# Patient Record
Sex: FEMALE | Race: WHITE
[De-identification: names, ages, dates, MRNs, and addresses within clinical notes are randomized per-mention and may not be internally consistent; named-entity substitution may affect disease eponyms.]

---

## 2017-11-14 ENCOUNTER — HOSPITAL ENCOUNTER (EMERGENCY)
Dept: HOSPITAL 12 - ER | Age: 47
Discharge: LEFT BEFORE DISCHARGE | End: 2017-11-14
Payer: MEDICAID

## 2017-11-14 VITALS — DIASTOLIC BLOOD PRESSURE: 86 MMHG | SYSTOLIC BLOOD PRESSURE: 132 MMHG

## 2017-11-14 VITALS — BODY MASS INDEX: 21.56 KG/M2 | HEIGHT: 64 IN | WEIGHT: 126.25 LBS

## 2017-11-14 DIAGNOSIS — R10.9: Primary | ICD-10-CM

## 2017-11-14 LAB
ALP SERPL-CCNC: 73 U/L (ref 50–136)
ALT SERPL W/O P-5'-P-CCNC: 22 U/L (ref 14–59)
APPEARANCE UR: CLEAR
AST SERPL-CCNC: 11 U/L (ref 15–37)
BASOPHILS # BLD AUTO: 0 K/UL (ref 0–8)
BASOPHILS NFR BLD AUTO: 0.1 % (ref 0–2)
BILIRUB DIRECT SERPL-MCNC: 0.1 MG/DL (ref 0–0.2)
BILIRUB SERPL-MCNC: 0.3 MG/DL (ref 0.2–1)
BILIRUB UR QL STRIP: NEGATIVE
BUN SERPL-MCNC: 9 MG/DL (ref 7–18)
CHLORIDE SERPL-SCNC: 102 MMOL/L (ref 98–107)
CO2 SERPL-SCNC: 25 MMOL/L (ref 21–32)
COLOR UR: YELLOW
CREAT SERPL-MCNC: 0.5 MG/DL (ref 0.6–1.3)
DEPRECATED SQUAMOUS URNS QL MICRO: (no result) /HPF
EOSINOPHIL # BLD AUTO: 0 K/UL (ref 0–0.7)
EOSINOPHIL NFR BLD AUTO: 0.4 % (ref 0–7)
GLUCOSE SERPL-MCNC: 87 MG/DL (ref 74–106)
GLUCOSE UR STRIP-MCNC: NEGATIVE MG/DL
HCG UR QL: NEGATIVE
HCT VFR BLD AUTO: 36 % (ref 37–47)
HGB BLD-MCNC: 11.5 G/DL (ref 12–16)
HGB UR QL STRIP: NEGATIVE
KETONES UR STRIP-MCNC: (no result) MG/DL
LEUKOCYTE ESTERASE UR QL STRIP: NEGATIVE
LIPASE SERPL-CCNC: 122 U/L (ref 73–393)
LYMPHOCYTES # BLD AUTO: 1.2 K/UL (ref 0.8–4.8)
LYMPHOCYTES NFR BLD AUTO: 10.6 % (ref 20.5–51.5)
LYMPHOCYTES NFR BLD MANUAL: 7 % (ref 20–40)
MCH RBC QN AUTO: 24.9 UUG (ref 27–31)
MCHC RBC AUTO-ENTMCNC: 32 G/DL (ref 32–37)
MCV RBC AUTO: 78.4 FL (ref 81–99)
MONOCYTES # BLD AUTO: 0.4 K/UL (ref 0.1–1.3)
MONOCYTES NFR BLD AUTO: 3.6 % (ref 0–11)
MONOCYTES NFR BLD MANUAL: 4 % (ref 2–10)
MUCOUS THREADS URNS QL MICRO: (no result) /LPF
NEUTROPHILS # BLD AUTO: 9.8 K/UL (ref 1.8–8.9)
NEUTROPHILS NFR BLD AUTO: 85.3 % (ref 38.5–71.5)
NEUTS BAND NFR BLD MANUAL: 12 % (ref 0–10)
NEUTS SEG NFR BLD MANUAL: 77 % (ref 42–75)
NITRITE UR QL STRIP: NEGATIVE
PH UR STRIP: 6 [PH] (ref 5–8)
PLATELET # BLD AUTO: 486 K/UL (ref 150–450)
POTASSIUM SERPL-SCNC: 3.7 MMOL/L (ref 3.5–5.1)
PROT UR QL STRIP: (no result)
RBC # BLD AUTO: 4.59 MIL/UL (ref 4.2–5.4)
RBC #/AREA URNS HPF: (no result) /HPF (ref 0–3)
SP GR UR STRIP: 1.03 (ref 1–1.03)
UROBILINOGEN UR STRIP-MCNC: 0.2 E.U./DL
WBC # BLD AUTO: 11.4 K/UL (ref 4–11.2)
WBC #/AREA URNS HPF: (no result) /HPF
WBC #/AREA URNS HPF: (no result) /HPF (ref 0–3)
WS STN SPEC: 7 G/DL (ref 6.4–8.2)

## 2017-11-14 PROCEDURE — 83690 ASSAY OF LIPASE: CPT

## 2017-11-14 PROCEDURE — 84703 CHORIONIC GONADOTROPIN ASSAY: CPT

## 2017-11-14 PROCEDURE — 74177 CT ABD & PELVIS W/CONTRAST: CPT

## 2017-11-14 PROCEDURE — 74020: CPT

## 2017-11-14 PROCEDURE — A4663 DIALYSIS BLOOD PRESSURE CUFF: HCPCS

## 2017-11-14 PROCEDURE — 99285 EMERGENCY DEPT VISIT HI MDM: CPT

## 2017-11-14 PROCEDURE — 80048 BASIC METABOLIC PNL TOTAL CA: CPT

## 2017-11-14 PROCEDURE — 96365 THER/PROPH/DIAG IV INF INIT: CPT

## 2017-11-14 PROCEDURE — 85025 COMPLETE CBC W/AUTO DIFF WBC: CPT

## 2017-11-14 PROCEDURE — 96375 TX/PRO/DX INJ NEW DRUG ADDON: CPT

## 2017-11-14 PROCEDURE — 96361 HYDRATE IV INFUSION ADD-ON: CPT

## 2017-11-14 PROCEDURE — 96376 TX/PRO/DX INJ SAME DRUG ADON: CPT

## 2017-11-14 PROCEDURE — 36415 COLL VENOUS BLD VENIPUNCTURE: CPT

## 2017-11-14 PROCEDURE — 81001 URINALYSIS AUTO W/SCOPE: CPT

## 2017-11-14 PROCEDURE — 80076 HEPATIC FUNCTION PANEL: CPT

## 2017-11-14 NOTE — NUR
Patient does not wish to proceed with medical care recommended by Dr. Cecilia LÓPEZ).  Patient given information related to possible complications, up to and 
including death, which could occur as a result of leaving the hospital at this 
time.  Patient verbalizes understanding of risks involved due to leaving 
against medical advice.  Patient has signed AMA form.

-------------------------------------------------------------------------------

Addendum: 11/14/17 at 1652 by PETEY

-------------------------------------------------------------------------------

COOPY OF ALL THE STUDIES PROVIDED FOR PT.

## 2018-01-04 ENCOUNTER — HOSPITAL ENCOUNTER (EMERGENCY)
Dept: HOSPITAL 12 - ER | Age: 48
Discharge: HOME | End: 2018-01-04
Payer: COMMERCIAL

## 2018-01-04 VITALS — SYSTOLIC BLOOD PRESSURE: 111 MMHG | DIASTOLIC BLOOD PRESSURE: 78 MMHG

## 2018-01-04 VITALS — HEIGHT: 60 IN | BODY MASS INDEX: 23.56 KG/M2 | WEIGHT: 120 LBS

## 2018-01-04 DIAGNOSIS — D64.9: ICD-10-CM

## 2018-01-04 DIAGNOSIS — Z85.038: ICD-10-CM

## 2018-01-04 DIAGNOSIS — K52.9: Primary | ICD-10-CM

## 2018-01-04 DIAGNOSIS — R10.31: ICD-10-CM

## 2018-01-04 DIAGNOSIS — Z98.890: ICD-10-CM

## 2018-01-04 DIAGNOSIS — Z90.49: ICD-10-CM

## 2018-01-04 LAB
ALP SERPL-CCNC: 85 U/L (ref 50–136)
ALT SERPL W/O P-5'-P-CCNC: 25 U/L (ref 14–59)
AST SERPL-CCNC: 12 U/L (ref 15–37)
BASOPHILS # BLD AUTO: 0.1 K/UL (ref 0–8)
BASOPHILS NFR BLD AUTO: 0.8 % (ref 0–2)
BILIRUB DIRECT SERPL-MCNC: 0.1 MG/DL (ref 0–0.2)
BILIRUB SERPL-MCNC: 0.1 MG/DL (ref 0.2–1)
BUN SERPL-MCNC: 10 MG/DL (ref 7–18)
CHLORIDE SERPL-SCNC: 103 MMOL/L (ref 98–107)
CO2 SERPL-SCNC: 27 MMOL/L (ref 21–32)
CREAT SERPL-MCNC: 0.6 MG/DL (ref 0.6–1.3)
EOSINOPHIL # BLD AUTO: 0 K/UL (ref 0–0.7)
EOSINOPHIL NFR BLD AUTO: 0.7 % (ref 0–7)
GLUCOSE SERPL-MCNC: 91 MG/DL (ref 74–106)
HCT VFR BLD AUTO: 30.4 % (ref 31.2–41.9)
HGB BLD-MCNC: 9.8 G/DL (ref 10.9–14.3)
LIPASE SERPL-CCNC: 190 U/L (ref 73–393)
LYMPHOCYTES # BLD AUTO: 2.7 K/UL (ref 20–40)
LYMPHOCYTES NFR BLD AUTO: 41.2 % (ref 20.5–51.5)
MCH RBC QN AUTO: 24.4 UUG (ref 24.7–32.8)
MCHC RBC AUTO-ENTMCNC: 32 G/DL (ref 32.3–35.6)
MCV RBC AUTO: 76 FL (ref 75.5–95.3)
MONOCYTES # BLD AUTO: 0.4 K/UL (ref 2–10)
MONOCYTES NFR BLD AUTO: 6.3 % (ref 0–11)
NEUTROPHILS # BLD AUTO: 3.3 K/UL (ref 1.8–8.9)
NEUTROPHILS NFR BLD AUTO: 51 % (ref 38.5–71.5)
PLATELET # BLD AUTO: 350 K/UL (ref 179–408)
POTASSIUM SERPL-SCNC: 3.6 MMOL/L (ref 3.5–5.1)
RBC # BLD AUTO: 4 MIL/UL (ref 3.63–4.92)
WBC # BLD AUTO: 6.5 K/UL (ref 3.8–11.8)
WS STN SPEC: 7.3 G/DL (ref 6.4–8.2)

## 2018-01-04 PROCEDURE — A4663 DIALYSIS BLOOD PRESSURE CUFF: HCPCS

## 2018-01-04 NOTE — NUR
Per MD orders, pt able to be discharged home. Pt able to ambulate unassisted w/ 
steady gait. Written and verbal after care instructions given. Pt instructed to 
return if pain continues and follow up with Primary physician. Pt verbalizes 
understanding of instructions.

## 2018-02-03 ENCOUNTER — HOSPITAL ENCOUNTER (EMERGENCY)
Dept: HOSPITAL 12 - ER | Age: 48
Discharge: HOME | End: 2018-02-03
Payer: MEDICAID

## 2018-02-03 VITALS — HEIGHT: 65 IN | BODY MASS INDEX: 19.99 KG/M2 | WEIGHT: 120 LBS

## 2018-02-03 DIAGNOSIS — J06.9: Primary | ICD-10-CM

## 2018-02-03 DIAGNOSIS — Z85.038: ICD-10-CM

## 2018-02-03 PROCEDURE — A4663 DIALYSIS BLOOD PRESSURE CUFF: HCPCS

## 2018-08-28 ENCOUNTER — HOSPITAL ENCOUNTER (EMERGENCY)
Dept: HOSPITAL 12 - ER | Age: 48
Discharge: HOME | End: 2018-08-28
Payer: COMMERCIAL

## 2018-08-28 VITALS — HEIGHT: 65 IN | WEIGHT: 120 LBS | BODY MASS INDEX: 19.99 KG/M2

## 2018-08-28 VITALS — SYSTOLIC BLOOD PRESSURE: 118 MMHG | DIASTOLIC BLOOD PRESSURE: 81 MMHG

## 2018-08-28 DIAGNOSIS — Z90.49: ICD-10-CM

## 2018-08-28 DIAGNOSIS — N39.0: Primary | ICD-10-CM

## 2018-08-28 LAB
APPEARANCE UR: CLEAR
BILIRUB UR QL STRIP: NEGATIVE
COLOR UR: YELLOW
DEPRECATED SQUAMOUS URNS QL MICRO: (no result) /HPF
GLUCOSE UR STRIP-MCNC: NEGATIVE MG/DL
HCG UR QL: NEGATIVE
HGB UR QL STRIP: (no result)
KETONES UR STRIP-MCNC: NEGATIVE MG/DL
LEUKOCYTE ESTERASE UR QL STRIP: NEGATIVE
MUCOUS THREADS URNS QL MICRO: (no result) /LPF
NITRITE UR QL STRIP: NEGATIVE
PH UR STRIP: 5.5 [PH] (ref 5–8)
PROT UR QL STRIP: NEGATIVE
RBC #/AREA URNS HPF: (no result) /HPF (ref 0–3)
SP GR UR STRIP: 1.02 (ref 1–1.03)
UROBILINOGEN UR STRIP-MCNC: 0.2 E.U./DL
WBC #/AREA URNS HPF: (no result) /HPF
WBC #/AREA URNS HPF: (no result) /HPF (ref 0–3)

## 2018-08-28 PROCEDURE — A4663 DIALYSIS BLOOD PRESSURE CUFF: HCPCS

## 2018-08-28 PROCEDURE — 84703 CHORIONIC GONADOTROPIN ASSAY: CPT

## 2018-08-28 PROCEDURE — 81001 URINALYSIS AUTO W/SCOPE: CPT

## 2018-08-28 PROCEDURE — 99284 EMERGENCY DEPT VISIT MOD MDM: CPT

## 2018-08-28 PROCEDURE — 87086 URINE CULTURE/COLONY COUNT: CPT

## 2018-08-28 NOTE — NUR
ADMIT A FEMALE PT IN RM 2A, AMBULATORY WITH A CHIEF C/O URGENCY IN URINATION 
BUT VERY LITTLE COMIS OUT. DENIES ANY PAIN.

## 2018-08-28 NOTE — NUR
-------------------------------------------------------------------------------

           *** Note teresa in ED - 08/28/18 at 1120 by DIEGO ***            

-------------------------------------------------------------------------------

ADMIT A FEMALE PT IN RM 2A, AMBULATORY FROM HOME WITH A CHIEF C/O DIFFICULTY IN 
URINATION/URGENCY BUT VERY LITTLE URINE OUT. DENIES OF ANY PAIN. URINE SPECIMEN 
SENT TO LAB.

## 2018-08-28 NOTE — NUR
PT IS BEING DISCHARGED. DR GARBER SPOKE WITH THE PT. DISCHARGE INSTRUCTION 
GIVEN TO PT WITH GOOD UNDERSTANDING. PRESCRIPTION ALSO GIVEN.

## 2019-03-09 ENCOUNTER — HOSPITAL ENCOUNTER (INPATIENT)
Dept: HOSPITAL 12 - ER | Age: 49
LOS: 1 days | Discharge: LEFT BEFORE DISCHARGE | DRG: 240 | End: 2019-03-10
Payer: COMMERCIAL

## 2019-03-09 VITALS — WEIGHT: 130 LBS | BODY MASS INDEX: 22.2 KG/M2 | HEIGHT: 64 IN

## 2019-03-09 VITALS — DIASTOLIC BLOOD PRESSURE: 78 MMHG | SYSTOLIC BLOOD PRESSURE: 115 MMHG

## 2019-03-09 VITALS — SYSTOLIC BLOOD PRESSURE: 128 MMHG | DIASTOLIC BLOOD PRESSURE: 83 MMHG

## 2019-03-09 VITALS — SYSTOLIC BLOOD PRESSURE: 114 MMHG | DIASTOLIC BLOOD PRESSURE: 80 MMHG

## 2019-03-09 DIAGNOSIS — N28.1: ICD-10-CM

## 2019-03-09 DIAGNOSIS — R11.10: ICD-10-CM

## 2019-03-09 DIAGNOSIS — C79.89: ICD-10-CM

## 2019-03-09 DIAGNOSIS — N83.9: ICD-10-CM

## 2019-03-09 DIAGNOSIS — R18.8: ICD-10-CM

## 2019-03-09 DIAGNOSIS — E88.09: ICD-10-CM

## 2019-03-09 DIAGNOSIS — Z90.49: ICD-10-CM

## 2019-03-09 DIAGNOSIS — C18.9: Primary | ICD-10-CM

## 2019-03-09 DIAGNOSIS — C78.7: ICD-10-CM

## 2019-03-09 LAB
ALP SERPL-CCNC: 88 U/L (ref 50–136)
ALT SERPL W/O P-5'-P-CCNC: 18 U/L (ref 14–59)
APPEARANCE UR: CLEAR
AST SERPL-CCNC: 20 U/L (ref 15–37)
BASOPHILS # BLD AUTO: 0.1 K/UL (ref 0–8)
BASOPHILS NFR BLD AUTO: 1.2 % (ref 0–2)
BILIRUB DIRECT SERPL-MCNC: 0.1 MG/DL (ref 0–0.2)
BILIRUB SERPL-MCNC: 0.4 MG/DL (ref 0.2–1)
BILIRUB UR QL STRIP: (no result)
BUN SERPL-MCNC: 6 MG/DL (ref 7–18)
CHLORIDE SERPL-SCNC: 100 MMOL/L (ref 98–107)
CO2 SERPL-SCNC: 27 MMOL/L (ref 21–32)
COLOR UR: YELLOW
CREAT SERPL-MCNC: 0.5 MG/DL (ref 0.6–1.3)
DEPRECATED SQUAMOUS URNS QL MICRO: (no result) /HPF
EOSINOPHIL # BLD AUTO: 0.1 K/UL (ref 0–0.7)
EOSINOPHIL NFR BLD AUTO: 1.4 % (ref 0–7)
GLUCOSE SERPL-MCNC: 97 MG/DL (ref 74–106)
GLUCOSE UR STRIP-MCNC: NEGATIVE MG/DL
HCT VFR BLD AUTO: 40.1 % (ref 31.2–41.9)
HGB BLD-MCNC: 13.2 G/DL (ref 10.9–14.3)
HGB UR QL STRIP: (no result)
KETONES UR STRIP-MCNC: (no result) MG/DL
LEUKOCYTE ESTERASE UR QL STRIP: (no result)
LIPASE SERPL-CCNC: 78 U/L (ref 73–393)
LYMPHOCYTES # BLD AUTO: 1.5 K/UL (ref 20–40)
LYMPHOCYTES NFR BLD AUTO: 24.2 % (ref 20.5–51.5)
MCH RBC QN AUTO: 27.2 UUG (ref 24.7–32.8)
MCHC RBC AUTO-ENTMCNC: 33 G/DL (ref 32.3–35.6)
MCV RBC AUTO: 82.6 FL (ref 75.5–95.3)
MONOCYTES # BLD AUTO: 0.4 K/UL (ref 2–10)
MONOCYTES NFR BLD AUTO: 6 % (ref 0–11)
NEUTROPHILS # BLD AUTO: 4.1 K/UL (ref 1.8–8.9)
NEUTROPHILS NFR BLD AUTO: 67.2 % (ref 38.5–71.5)
NITRITE UR QL STRIP: NEGATIVE
PH UR STRIP: 6.5 [PH] (ref 5–8)
PLATELET # BLD AUTO: 421 K/UL (ref 179–408)
POTASSIUM SERPL-SCNC: 3.5 MMOL/L (ref 3.5–5.1)
RBC # BLD AUTO: 4.86 MIL/UL (ref 3.63–4.92)
RBC #/AREA URNS HPF: (no result) /HPF (ref 0–3)
SP GR UR STRIP: 1.02 (ref 1–1.03)
UROBILINOGEN UR STRIP-MCNC: 0.2 E.U./DL
WBC # BLD AUTO: 6.1 K/UL (ref 3.8–11.8)
WBC #/AREA URNS HPF: (no result) /HPF
WBC #/AREA URNS HPF: (no result) /HPF (ref 0–3)
WS STN SPEC: 7.7 G/DL (ref 6.4–8.2)

## 2019-03-09 PROCEDURE — G0378 HOSPITAL OBSERVATION PER HR: HCPCS

## 2019-03-09 PROCEDURE — A4663 DIALYSIS BLOOD PRESSURE CUFF: HCPCS

## 2019-03-09 RX ADMIN — SODIUM CHLORIDE PRN MLS/HR: 0.9 INJECTION, SOLUTION INTRAVENOUS at 12:49

## 2019-03-09 RX ADMIN — Medication PRN MG: at 18:46

## 2019-03-09 RX ADMIN — Medication PRN MG: at 13:08

## 2019-03-09 NOTE — NUR
ADMITTED FROM HOME A 47 YO FEMALE WITH ADM DX OF ABDOMINAL PAIN AWAKE ALERT AND ORIENTED X3, 
C/O OF SLIGHT FEELING OF NAUSEA, LAST BM 3/8. ROUTINE ADMISSION ASSESSMENT INITIATED. WILL 
NOTIFY MD FOR ADMISSION ORDERS

## 2019-03-09 NOTE — NUR
SBAR RECEIVED FROM DAY SHIFT NURSE. PATIENT ALERT AND ORIENTED X 4. C/O PAIN IN ABDOMEN UPON 
ASSESSMENT. WILL MEDICATE. NO C/O NAUSEA AT THIS TIME. WILL CONTINUE TO MONITOR.

## 2019-03-09 NOTE — NUR
CT orion Sweet [email or web address] and said that she can not see the CT order in her tech 
work list .  CT orion Sweet asked MD to reorder the CT scan.  Dr Kendall notified.

## 2019-03-10 VITALS — SYSTOLIC BLOOD PRESSURE: 96 MMHG | DIASTOLIC BLOOD PRESSURE: 65 MMHG

## 2019-03-10 VITALS — DIASTOLIC BLOOD PRESSURE: 70 MMHG | SYSTOLIC BLOOD PRESSURE: 103 MMHG

## 2019-03-10 RX ADMIN — Medication PRN MG: at 05:02

## 2019-03-10 RX ADMIN — SODIUM CHLORIDE PRN MLS/HR: 0.9 INJECTION, SOLUTION INTRAVENOUS at 05:01

## 2019-03-10 NOTE — NUR
MD ZULUAGA ORDER 1 MG DILAUDID IV FOR SEVERE PAIN. WILL ADMINISTER AFTER PHARMACY APPROVAL. 
PATIENT CALL AND RESTING IN BED. STILL REFUSING TELEMETRY MONITORING. WILL ENDORSE TO 
ONCOMING SHIFT ACCORDINGLY.

## 2019-03-10 NOTE — NUR
PATIENT REFUSED MORNING BLOOD DRAW AND TELEMETRY MONITORING. PATIENT VERY AGGRAVATED AND 
STATING THAT WE ARE "TWO FACED." STATES THAT PAIN MEDICATION IS NOT WORKING FOR HER DESPITE 
REPORTING PAIN MEDICATION WORKING FOR HER WHEN ASKED ALL NIGHT LONG. CHARGE NURSE SPOKE WITH 
PATIENT AND IS REQUESTING STRONGER PAIN MEDICATION. WILL CONTACT DOCTOR. WILL CONTINUE TO 
MONITOR.